# Patient Record
Sex: FEMALE | Race: WHITE
[De-identification: names, ages, dates, MRNs, and addresses within clinical notes are randomized per-mention and may not be internally consistent; named-entity substitution may affect disease eponyms.]

---

## 2017-05-29 ENCOUNTER — TRANSCRIPTION ENCOUNTER (OUTPATIENT)
Age: 32
End: 2017-05-29

## 2018-05-07 PROBLEM — Z00.00 ENCOUNTER FOR PREVENTIVE HEALTH EXAMINATION: Status: ACTIVE | Noted: 2018-05-07

## 2018-06-11 ENCOUNTER — TRANSCRIPTION ENCOUNTER (OUTPATIENT)
Age: 33
End: 2018-06-11

## 2019-06-28 ENCOUNTER — ASOB RESULT (OUTPATIENT)
Age: 34
End: 2019-06-28

## 2019-06-28 ENCOUNTER — APPOINTMENT (OUTPATIENT)
Dept: ANTEPARTUM | Facility: CLINIC | Age: 34
End: 2019-06-28
Payer: COMMERCIAL

## 2019-06-28 PROCEDURE — 76856 US EXAM PELVIC COMPLETE: CPT | Mod: 59

## 2019-06-28 PROCEDURE — 76830 TRANSVAGINAL US NON-OB: CPT

## 2019-10-25 ENCOUNTER — EMERGENCY (EMERGENCY)
Facility: HOSPITAL | Age: 34
LOS: 0 days | Discharge: HOME | End: 2019-10-25
Attending: EMERGENCY MEDICINE
Payer: COMMERCIAL

## 2019-10-25 VITALS
HEART RATE: 76 BPM | TEMPERATURE: 98 F | SYSTOLIC BLOOD PRESSURE: 102 MMHG | RESPIRATION RATE: 17 BRPM | DIASTOLIC BLOOD PRESSURE: 70 MMHG | OXYGEN SATURATION: 100 %

## 2019-10-25 VITALS — HEIGHT: 60 IN | WEIGHT: 119.93 LBS

## 2019-10-25 DIAGNOSIS — R11.2 NAUSEA WITH VOMITING, UNSPECIFIED: ICD-10-CM

## 2019-10-25 DIAGNOSIS — M54.9 DORSALGIA, UNSPECIFIED: ICD-10-CM

## 2019-10-25 DIAGNOSIS — R50.9 FEVER, UNSPECIFIED: ICD-10-CM

## 2019-10-25 DIAGNOSIS — K80.20 CALCULUS OF GALLBLADDER WITHOUT CHOLECYSTITIS WITHOUT OBSTRUCTION: ICD-10-CM

## 2019-10-25 LAB
ALBUMIN SERPL ELPH-MCNC: 3.8 G/DL — SIGNIFICANT CHANGE UP (ref 3.3–5)
ALP SERPL-CCNC: 42 U/L — SIGNIFICANT CHANGE UP (ref 40–120)
ALT FLD-CCNC: 16 U/L — SIGNIFICANT CHANGE UP (ref 12–78)
ANION GAP SERPL CALC-SCNC: 9 MMOL/L — SIGNIFICANT CHANGE UP (ref 5–17)
APPEARANCE UR: ABNORMAL
AST SERPL-CCNC: 14 U/L — LOW (ref 15–37)
BASOPHILS # BLD AUTO: 0.04 K/UL — SIGNIFICANT CHANGE UP (ref 0–0.2)
BASOPHILS NFR BLD AUTO: 0.4 % — SIGNIFICANT CHANGE UP (ref 0–2)
BILIRUB SERPL-MCNC: 0.5 MG/DL — SIGNIFICANT CHANGE UP (ref 0.2–1.2)
BILIRUB UR-MCNC: NEGATIVE — SIGNIFICANT CHANGE UP
BUN SERPL-MCNC: 10 MG/DL — SIGNIFICANT CHANGE UP (ref 7–23)
CALCIUM SERPL-MCNC: 8.8 MG/DL — SIGNIFICANT CHANGE UP (ref 8.5–10.1)
CHLORIDE SERPL-SCNC: 107 MMOL/L — SIGNIFICANT CHANGE UP (ref 96–108)
CO2 SERPL-SCNC: 24 MMOL/L — SIGNIFICANT CHANGE UP (ref 22–31)
COLOR SPEC: YELLOW — SIGNIFICANT CHANGE UP
CREAT SERPL-MCNC: 0.59 MG/DL — SIGNIFICANT CHANGE UP (ref 0.5–1.3)
DIFF PNL FLD: NEGATIVE — SIGNIFICANT CHANGE UP
EOSINOPHIL # BLD AUTO: 0 K/UL — SIGNIFICANT CHANGE UP (ref 0–0.5)
EOSINOPHIL NFR BLD AUTO: 0 % — SIGNIFICANT CHANGE UP (ref 0–6)
GLUCOSE SERPL-MCNC: 97 MG/DL — SIGNIFICANT CHANGE UP (ref 70–99)
GLUCOSE UR QL: NEGATIVE MG/DL — SIGNIFICANT CHANGE UP
HCT VFR BLD CALC: 38.9 % — SIGNIFICANT CHANGE UP (ref 34.5–45)
HGB BLD-MCNC: 12.9 G/DL — SIGNIFICANT CHANGE UP (ref 11.5–15.5)
IMM GRANULOCYTES NFR BLD AUTO: 0.3 % — SIGNIFICANT CHANGE UP (ref 0–1.5)
KETONES UR-MCNC: ABNORMAL
LACTATE SERPL-SCNC: 1.1 MMOL/L — SIGNIFICANT CHANGE UP (ref 0.7–2)
LEUKOCYTE ESTERASE UR-ACNC: ABNORMAL
LIDOCAIN IGE QN: 60 U/L — LOW (ref 73–393)
LYMPHOCYTES # BLD AUTO: 1.04 K/UL — SIGNIFICANT CHANGE UP (ref 1–3.3)
LYMPHOCYTES # BLD AUTO: 11.2 % — LOW (ref 13–44)
MAGNESIUM SERPL-MCNC: 2.3 MG/DL — SIGNIFICANT CHANGE UP (ref 1.6–2.6)
MCHC RBC-ENTMCNC: 30.6 PG — SIGNIFICANT CHANGE UP (ref 27–34)
MCHC RBC-ENTMCNC: 33.2 GM/DL — SIGNIFICANT CHANGE UP (ref 32–36)
MCV RBC AUTO: 92.2 FL — SIGNIFICANT CHANGE UP (ref 80–100)
MONOCYTES # BLD AUTO: 0.41 K/UL — SIGNIFICANT CHANGE UP (ref 0–0.9)
MONOCYTES NFR BLD AUTO: 4.4 % — SIGNIFICANT CHANGE UP (ref 2–14)
NEUTROPHILS # BLD AUTO: 7.79 K/UL — HIGH (ref 1.8–7.4)
NEUTROPHILS NFR BLD AUTO: 83.7 % — HIGH (ref 43–77)
NITRITE UR-MCNC: NEGATIVE — SIGNIFICANT CHANGE UP
PH UR: 6.5 — SIGNIFICANT CHANGE UP (ref 5–8)
PLATELET # BLD AUTO: 263 K/UL — SIGNIFICANT CHANGE UP (ref 150–400)
POTASSIUM SERPL-MCNC: 3.6 MMOL/L — SIGNIFICANT CHANGE UP (ref 3.5–5.3)
POTASSIUM SERPL-SCNC: 3.6 MMOL/L — SIGNIFICANT CHANGE UP (ref 3.5–5.3)
PROT SERPL-MCNC: 7.6 GM/DL — SIGNIFICANT CHANGE UP (ref 6–8.3)
PROT UR-MCNC: 15 MG/DL
RBC # BLD: 4.22 M/UL — SIGNIFICANT CHANGE UP (ref 3.8–5.2)
RBC # FLD: 11.9 % — SIGNIFICANT CHANGE UP (ref 10.3–14.5)
SODIUM SERPL-SCNC: 140 MMOL/L — SIGNIFICANT CHANGE UP (ref 135–145)
SP GR SPEC: 1.01 — SIGNIFICANT CHANGE UP (ref 1.01–1.02)
UROBILINOGEN FLD QL: NEGATIVE MG/DL — SIGNIFICANT CHANGE UP
WBC # BLD: 9.31 K/UL — SIGNIFICANT CHANGE UP (ref 3.8–10.5)
WBC # FLD AUTO: 9.31 K/UL — SIGNIFICANT CHANGE UP (ref 3.8–10.5)

## 2019-10-25 PROCEDURE — 36415 COLL VENOUS BLD VENIPUNCTURE: CPT

## 2019-10-25 PROCEDURE — 99284 EMERGENCY DEPT VISIT MOD MDM: CPT

## 2019-10-25 PROCEDURE — 87040 BLOOD CULTURE FOR BACTERIA: CPT

## 2019-10-25 PROCEDURE — 99284 EMERGENCY DEPT VISIT MOD MDM: CPT | Mod: 25

## 2019-10-25 PROCEDURE — 87086 URINE CULTURE/COLONY COUNT: CPT

## 2019-10-25 PROCEDURE — 71260 CT THORAX DX C+: CPT

## 2019-10-25 PROCEDURE — 96374 THER/PROPH/DIAG INJ IV PUSH: CPT

## 2019-10-25 PROCEDURE — 80053 COMPREHEN METABOLIC PANEL: CPT

## 2019-10-25 PROCEDURE — 93005 ELECTROCARDIOGRAM TRACING: CPT

## 2019-10-25 PROCEDURE — 85025 COMPLETE CBC W/AUTO DIFF WBC: CPT

## 2019-10-25 PROCEDURE — 93010 ELECTROCARDIOGRAM REPORT: CPT

## 2019-10-25 PROCEDURE — 81001 URINALYSIS AUTO W/SCOPE: CPT

## 2019-10-25 PROCEDURE — 74177 CT ABD & PELVIS W/CONTRAST: CPT

## 2019-10-25 PROCEDURE — 71260 CT THORAX DX C+: CPT | Mod: 26

## 2019-10-25 PROCEDURE — 74177 CT ABD & PELVIS W/CONTRAST: CPT | Mod: 26

## 2019-10-25 PROCEDURE — 83735 ASSAY OF MAGNESIUM: CPT

## 2019-10-25 PROCEDURE — 83605 ASSAY OF LACTIC ACID: CPT

## 2019-10-25 PROCEDURE — 83690 ASSAY OF LIPASE: CPT

## 2019-10-25 RX ORDER — SODIUM CHLORIDE 9 MG/ML
1000 INJECTION INTRAMUSCULAR; INTRAVENOUS; SUBCUTANEOUS ONCE
Refills: 0 | Status: COMPLETED | OUTPATIENT
Start: 2019-10-25 | End: 2019-10-25

## 2019-10-25 RX ORDER — ONDANSETRON 8 MG/1
4 TABLET, FILM COATED ORAL ONCE
Refills: 0 | Status: COMPLETED | OUTPATIENT
Start: 2019-10-25 | End: 2019-10-25

## 2019-10-25 RX ADMIN — SODIUM CHLORIDE 2000 MILLILITER(S): 9 INJECTION INTRAMUSCULAR; INTRAVENOUS; SUBCUTANEOUS at 16:57

## 2019-10-25 RX ADMIN — ONDANSETRON 4 MILLIGRAM(S): 8 TABLET, FILM COATED ORAL at 18:35

## 2019-10-25 RX ADMIN — SODIUM CHLORIDE 1000 MILLILITER(S): 9 INJECTION INTRAMUSCULAR; INTRAVENOUS; SUBCUTANEOUS at 18:35

## 2019-10-25 NOTE — ED STATDOCS - OBJECTIVE STATEMENT
33 y/o female with no pertinent PMHX presents to the ED c/o back pain today. +nausea, +vomiting x1 this AM, +fever. Also reports right sided abdominal pain. Pt reports intermittent subjective fever since 10/1/19. Had 2 BMs today. Denies diarrhea, SOB. Has been taking Motrin when needed. States she had imaging done at [facility] yesterday, was called back by PA at PMD Alia's office today with the results, and PA urged pt to come to the ED for further evaluation. No recent travel. Non smoker. No fhx of respiratory issues. PMD: Alia.

## 2019-10-25 NOTE — ED STATDOCS - PROGRESS NOTE DETAILS
33 y/o F with no PMH presents with right sided mid back pain, vomiting, subjective fever x 2-3 days. Saw PCP, had outpatient US which showed gallstones yesterday and reported today, advised to go to ED. Denies HA, dizziness, cough, dysuria, hematuria, vaginal bleeding/discharge, diarrhea, constipation. PCP: Dr. Rojo. PE: Well appearing. Cardiac: s1s2, RRR. Lungs: decreased breath sounds right base. CTA left lung fields. Abdomen: NBS x4, soft, nontender. No CVAT. A/P: R/o cholecystitis, PNA. Plan for CT C/A/P, labs, IVF, analgesia PRN, antiemetics. Reassess. - Chaparro Holly PA-C Labs and imaging reviewed. +gallstones in bladder. No noted acute pathology on imaging. Labs unremarkable. Patient made aware of findings. WIll d/c at this time with surgical follow up. Strict return precautions provided including worsening pain, vomiting, yellow discoloration to skin or eyes, abnormal behavior. Will d/c at this time. - Chaparro Holly PA-C

## 2019-10-25 NOTE — ED STATDOCS - GASTROINTESTINAL, MLM
abdomen soft,  and non-distended. Bowel sounds present. +mild diffuse TTP without guarding or rebound abdomen soft,  and non-distended. Bowel sounds present. +mild lower abd TTP without guarding or rebound

## 2019-10-25 NOTE — ED ADULT NURSE NOTE - NSIMPLEMENTINTERV_GEN_ALL_ED
Implemented All Universal Safety Interventions:  [place] to call system. Call bell, personal items and telephone within reach. Instruct patient to call for assistance. Room bathroom lighting operational. Non-slip footwear when patient is off stretcher. Physically safe environment: no spills, clutter or unnecessary equipment. Stretcher in lowest position, wheels locked, appropriate side rails in place.

## 2019-10-25 NOTE — ED ADULT NURSE NOTE - OBJECTIVE STATEMENT
pt presents to ED c/o RUQ pain, HA, n/v, and fevers at home x1 months. reports she got a CT abd and pelvis that showed gallstones and was instructed by her MD to come to the ED. Pt AOx4, breathing symmetrical and unlabored, in no acute distress at this time.

## 2019-10-25 NOTE — ED STATDOCS - CARE PROVIDER_API CALL
Rehana Cleaning)  Surgery; Surgical Critical Care  [address] [facility], [address] [place] [address]  Phone: [phone]  Fax: [phone]  Follow Up Time:

## 2019-10-25 NOTE — ED STATDOCS - CLINICAL SUMMARY MEDICAL DECISION MAKING FREE TEXT BOX
Pt here with right sided abdominal pain, nausea, vomiting. Plan: labs, CT, reassess. Pt here with right sided abdominal pain, nausea, vomiting. Plan: labs, CT, reassess. CTs +for gallstones, otherwise unremarkable. Labs unremarkable. Pt feels well. WIll d/c at this time with outpatient surgical follow up. Strict return precautions provided.

## 2019-10-25 NOTE — ED STATDOCS - PATIENT PORTAL LINK FT
You can access the FollowMyHealth Patient Portal offered by [facility] by registering at the following website: [email or web address]. By joining [facility]’s FollowMyHealth portal, you will also be able to view your health information using other applications (apps) compatible with our system.

## 2019-10-25 NOTE — ED ADULT TRIAGE NOTE - CHIEF COMPLAINT QUOTE
Patient presents complaining of back pain for "weeks". Sent for CT scan after out patient ultrasound showed "gallbladder problem".

## 2019-10-25 NOTE — ED STATDOCS - ATTENDING CONTRIBUTION TO CARE
I, Sharmila Finn MD,  performed the initial face to face bedside interview with this patient regarding history of present illness, review of symptoms and relevant past medical, social and family history.  I completed an independent physical examination.  I was the initial provider who evaluated this patient. I have signed out the follow up of any pending tests (i.e. labs, radiological studies) to the ACP.  I have communicated the patient’s plan of care and disposition with the ACP.  The history, relevant review of systems, past medical and surgical history, medical decision making, and physical examination was documented by the scribe in my presence and I attest to the accuracy of the documentation.

## 2019-10-26 LAB
CULTURE RESULTS: SIGNIFICANT CHANGE UP
SPECIMEN SOURCE: SIGNIFICANT CHANGE UP

## 2019-10-30 LAB
CULTURE RESULTS: SIGNIFICANT CHANGE UP
CULTURE RESULTS: SIGNIFICANT CHANGE UP
SPECIMEN SOURCE: SIGNIFICANT CHANGE UP
SPECIMEN SOURCE: SIGNIFICANT CHANGE UP

## 2019-11-06 ENCOUNTER — OUTPATIENT (OUTPATIENT)
Dept: OUTPATIENT SERVICES | Facility: HOSPITAL | Age: 34
LOS: 1 days | End: 2019-11-06
Payer: COMMERCIAL

## 2019-11-06 VITALS
HEIGHT: 60 IN | RESPIRATION RATE: 14 BRPM | WEIGHT: 119.05 LBS | TEMPERATURE: 98 F | DIASTOLIC BLOOD PRESSURE: 60 MMHG | SYSTOLIC BLOOD PRESSURE: 101 MMHG | HEART RATE: 88 BPM | OXYGEN SATURATION: 98 %

## 2019-11-06 DIAGNOSIS — Z98.890 OTHER SPECIFIED POSTPROCEDURAL STATES: Chronic | ICD-10-CM

## 2019-11-06 DIAGNOSIS — K80.10 CALCULUS OF GALLBLADDER WITH CHRONIC CHOLECYSTITIS WITHOUT OBSTRUCTION: ICD-10-CM

## 2019-11-06 DIAGNOSIS — Z01.818 ENCOUNTER FOR OTHER PREPROCEDURAL EXAMINATION: ICD-10-CM

## 2019-11-06 LAB — HCG SERPL-ACNC: <1 MIU/ML — SIGNIFICANT CHANGE UP

## 2019-11-06 PROCEDURE — 86900 BLOOD TYPING SEROLOGIC ABO: CPT

## 2019-11-06 PROCEDURE — 84702 CHORIONIC GONADOTROPIN TEST: CPT

## 2019-11-06 PROCEDURE — G0463: CPT

## 2019-11-06 PROCEDURE — 86850 RBC ANTIBODY SCREEN: CPT

## 2019-11-06 PROCEDURE — 86901 BLOOD TYPING SEROLOGIC RH(D): CPT

## 2019-11-06 PROCEDURE — 36415 COLL VENOUS BLD VENIPUNCTURE: CPT

## 2019-11-06 NOTE — H&P PST ADULT - NSANTHOSAYNRD_GEN_A_CORE
No. WENDI screening performed.  STOP BANG Legend: 0-2 = LOW Risk; 3-4 = INTERMEDIATE Risk; 5-8 = HIGH Risk

## 2019-11-06 NOTE — H&P PST ADULT - NSICDXPROBLEM_GEN_ALL_CORE_FT
PROBLEM DIAGNOSES  Problem: Calculus of gallbladder with chronic cholecystitis without obstruction  Assessment and Plan: Labs on chart from Health system on 10/25/19 - T&S and HcG drwan today in PST - No medical clearance needed - pt instructed to stop any NSAIDS/Herbal supplements between now and procedure - pt may take Tylenol if needed for pain between now and procedure - pre-op instructions as well as pre-op wash instructions given to tp with understanding verbalized

## 2019-11-06 NOTE — H&P PST ADULT - NSICDXPASTMEDICALHX_GEN_ALL_CORE_FT
PAST MEDICAL HISTORY:  Calculus of gallbladder with chronic cholecystitis without obstruction     H/O dizziness Notes due to H/O Hypotension    H/O hypotension     Migraines

## 2019-11-06 NOTE — H&P PST ADULT - HISTORY OF PRESENT ILLNESS
34 year old female presents for PST prior to laparoscopic Cholecystectomy with Dr Warren Graham on 11/15/19 - Pt notes she has had "3 Gallbladder attacks since 10/1/19." Pt reports attacks had nausea, vomiting, abdominal pain from right breast around to back although notes mostly concentrated to the back." (10/1/19, 10/13/19 - unable to work Mon 10/14/19 and then again on 10/25/19) - pt notes on 10/24 she was seen by her PCP and sent for Sonogram however on  10/25 she presented to [facility] ER as she was having fever as well as N/V, Pain - had blood work, EKG, Abd Sono- pt was told to follow up as outpatient with surgeon which she notes never called her back - pt then chose to see Dr Graham - following consult with Dr Graham pt is electing for scheduled procedure.

## 2019-11-06 NOTE — H&P PST ADULT - ASSESSMENT
34 year old female with Calculus of Gallbladder with chronic cholecystitis without obstruction - scheduled for laparoscopic Cholecystectomy with Dr Warren Graham on 11/15/19 -

## 2019-11-06 NOTE — H&P PST ADULT - GENERAL COMMENTS
Pt does note she is currently taking course of ABX Amoxicillin for Strep Throat DX Mon 11/4/19 - Course will finish on 11/12/19 -

## 2019-11-14 ENCOUNTER — TRANSCRIPTION ENCOUNTER (OUTPATIENT)
Age: 34
End: 2019-11-14

## 2019-11-14 NOTE — ASU PATIENT PROFILE, ADULT - PMH
Calculus of gallbladder with chronic cholecystitis without obstruction    H/O dizziness  Notes due to H/O Hypotension  H/O hypotension    Migraines

## 2019-11-15 ENCOUNTER — RESULT REVIEW (OUTPATIENT)
Age: 34
End: 2019-11-15

## 2019-11-15 ENCOUNTER — OUTPATIENT (OUTPATIENT)
Dept: OUTPATIENT SERVICES | Facility: HOSPITAL | Age: 34
LOS: 1 days | End: 2019-11-15
Payer: COMMERCIAL

## 2019-11-15 VITALS
HEIGHT: 60 IN | SYSTOLIC BLOOD PRESSURE: 117 MMHG | WEIGHT: 119.05 LBS | OXYGEN SATURATION: 99 % | RESPIRATION RATE: 14 BRPM | TEMPERATURE: 98 F | HEART RATE: 72 BPM | DIASTOLIC BLOOD PRESSURE: 61 MMHG

## 2019-11-15 VITALS
OXYGEN SATURATION: 98 % | DIASTOLIC BLOOD PRESSURE: 64 MMHG | SYSTOLIC BLOOD PRESSURE: 103 MMHG | TEMPERATURE: 98 F | RESPIRATION RATE: 15 BRPM | HEART RATE: 67 BPM

## 2019-11-15 DIAGNOSIS — Z98.890 OTHER SPECIFIED POSTPROCEDURAL STATES: Chronic | ICD-10-CM

## 2019-11-15 DIAGNOSIS — K80.10 CALCULUS OF GALLBLADDER WITH CHRONIC CHOLECYSTITIS WITHOUT OBSTRUCTION: ICD-10-CM

## 2019-11-15 DIAGNOSIS — Z01.818 ENCOUNTER FOR OTHER PREPROCEDURAL EXAMINATION: ICD-10-CM

## 2019-11-15 LAB — HCG SERPL-ACNC: <1 MIU/ML — SIGNIFICANT CHANGE UP

## 2019-11-15 PROCEDURE — 47562 LAPAROSCOPIC CHOLECYSTECTOMY: CPT

## 2019-11-15 PROCEDURE — 88304 TISSUE EXAM BY PATHOLOGIST: CPT

## 2019-11-15 PROCEDURE — 88304 TISSUE EXAM BY PATHOLOGIST: CPT | Mod: 26

## 2019-11-15 PROCEDURE — 47562 LAPAROSCOPIC CHOLECYSTECTOMY: CPT | Mod: AS

## 2019-11-15 PROCEDURE — 84702 CHORIONIC GONADOTROPIN TEST: CPT

## 2019-11-15 PROCEDURE — 36415 COLL VENOUS BLD VENIPUNCTURE: CPT

## 2019-11-15 PROCEDURE — C1889: CPT

## 2019-11-15 RX ORDER — HYDROMORPHONE HYDROCHLORIDE 2 MG/ML
0.5 INJECTION INTRAMUSCULAR; INTRAVENOUS; SUBCUTANEOUS
Refills: 0 | Status: DISCONTINUED | OUTPATIENT
Start: 2019-11-15 | End: 2019-11-15

## 2019-11-15 RX ORDER — CEFAZOLIN SODIUM 1 G
2000 VIAL (EA) INJECTION ONCE
Refills: 0 | Status: COMPLETED | OUTPATIENT
Start: 2019-11-15 | End: 2019-11-15

## 2019-11-15 RX ORDER — OXYCODONE HYDROCHLORIDE 5 MG/1
5 TABLET ORAL ONCE
Refills: 0 | Status: DISCONTINUED | OUTPATIENT
Start: 2019-11-15 | End: 2019-11-15

## 2019-11-15 RX ORDER — IBUPROFEN 200 MG
1 TABLET ORAL
Qty: 30 | Refills: 0
Start: 2019-11-15 | End: 2019-11-21

## 2019-11-15 RX ORDER — OXYCODONE HYDROCHLORIDE 5 MG/1
10 TABLET ORAL ONCE
Refills: 0 | Status: DISCONTINUED | OUTPATIENT
Start: 2019-11-15 | End: 2019-11-15

## 2019-11-15 RX ORDER — HYDROMORPHONE HYDROCHLORIDE 2 MG/ML
0.5 INJECTION INTRAMUSCULAR; INTRAVENOUS; SUBCUTANEOUS ONCE
Refills: 0 | Status: DISCONTINUED | OUTPATIENT
Start: 2019-11-15 | End: 2019-11-15

## 2019-11-15 RX ORDER — SODIUM CHLORIDE 9 MG/ML
1000 INJECTION, SOLUTION INTRAVENOUS
Refills: 0 | Status: DISCONTINUED | OUTPATIENT
Start: 2019-11-15 | End: 2019-11-15

## 2019-11-15 RX ORDER — HYDROMORPHONE HYDROCHLORIDE 2 MG/ML
1 INJECTION INTRAMUSCULAR; INTRAVENOUS; SUBCUTANEOUS ONCE
Refills: 0 | Status: DISCONTINUED | OUTPATIENT
Start: 2019-11-15 | End: 2019-11-15

## 2019-11-15 RX ORDER — METOCLOPRAMIDE HCL 10 MG
10 TABLET ORAL ONCE
Refills: 0 | Status: COMPLETED | OUTPATIENT
Start: 2019-11-15 | End: 2019-11-15

## 2019-11-15 RX ORDER — DOCUSATE SODIUM 100 MG
1 CAPSULE ORAL
Qty: 30 | Refills: 0
Start: 2019-11-15 | End: 2019-11-24

## 2019-11-15 RX ADMIN — HYDROMORPHONE HYDROCHLORIDE 0.5 MILLIGRAM(S): 2 INJECTION INTRAMUSCULAR; INTRAVENOUS; SUBCUTANEOUS at 16:29

## 2019-11-15 RX ADMIN — Medication 10 MILLIGRAM(S): at 16:44

## 2019-11-15 RX ADMIN — HYDROMORPHONE HYDROCHLORIDE 0.5 MILLIGRAM(S): 2 INJECTION INTRAMUSCULAR; INTRAVENOUS; SUBCUTANEOUS at 16:07

## 2019-11-15 RX ADMIN — SODIUM CHLORIDE 75 MILLILITER(S): 9 INJECTION, SOLUTION INTRAVENOUS at 12:53

## 2019-11-15 RX ADMIN — SODIUM CHLORIDE 100 MILLILITER(S): 9 INJECTION, SOLUTION INTRAVENOUS at 15:58

## 2019-11-15 RX ADMIN — OXYCODONE HYDROCHLORIDE 5 MILLIGRAM(S): 5 TABLET ORAL at 16:50

## 2019-11-15 RX ADMIN — HYDROMORPHONE HYDROCHLORIDE 0.5 MILLIGRAM(S): 2 INJECTION INTRAMUSCULAR; INTRAVENOUS; SUBCUTANEOUS at 16:39

## 2019-11-15 RX ADMIN — HYDROMORPHONE HYDROCHLORIDE 0.5 MILLIGRAM(S): 2 INJECTION INTRAMUSCULAR; INTRAVENOUS; SUBCUTANEOUS at 15:56

## 2019-11-15 NOTE — ASU DISCHARGE PLAN (ADULT/PEDIATRIC) - CARE PROVIDER_API CALL
Warren Graham)  Surgery  [address] [facility], [address] [place] [address]  Phone: [phone]  Fax: [phone]  Follow Up Time:

## 2019-11-15 NOTE — BRIEF OPERATIVE NOTE - OPERATION/FINDINGS
Pericholecystic edema and fatty deposition.  Multiple stones in gallbladder.  Critical view of safety obtained.

## 2019-11-15 NOTE — ASU DISCHARGE PLAN (ADULT/PEDIATRIC) - ASU DC SPECIAL INSTRUCTIONSFT
You may remove the clear adhesive and gauze dressings and shower in 48 hours.  Do not remove steri-strips.  Do not let water hit wounds directly, do not rub incisions.      No heavy lifting (nothing heavier than 5 lbs.), no strenuous physical activity, no exercise.      You may perform your usual daily tasks as tolerated.      You may resume your usual daily diet as tolerated.      Do not drive for 24 hours following anesthesia.  Do not drive while taking narcotic pain meds.      Take percocet as prescribed for pain.  Take colace as prescribed while taking narcotic pain medication to prevent constipation.  Take ibuprofen as prescribed for moderate pain - take with food.      Follow-up with Dr. Graham in his office next week - call office for appointment if not already made.

## 2019-11-15 NOTE — BRIEF OPERATIVE NOTE - NSICDXBRIEFPROCEDURE_GEN_ALL_CORE_FT
PROCEDURES:  Laparoscopic cholecystectomy in patient older than 10 years of age 15-Nov-2019 15:55:42  Eliecer Jarquin

## 2019-11-19 LAB — SURGICAL PATHOLOGY STUDY: SIGNIFICANT CHANGE UP

## 2020-02-11 NOTE — ED STATDOCS - NSCAREINITIATED _GEN_ER
Closed Head Injury    A closed head injury is an injury to your head that may or may not involve a traumatic brain injury (TBI). Symptoms of TBI can be short or long lasting and include headache, dizziness, interference with memory or speech, fatigue, confusion, changes in sleep, mood changes, nausea, depression/anxiety, and dulling of senses. Make sure to obtain proper rest which includes getting plenty of sleep, avoiding excessive visual stimulation, and avoiding activities that may cause physical or mental stress. Avoid any situation where there is potential for another head injury, including sports.    SEEK IMMEDIATE MEDICAL CARE IF YOU HAVE ANY OF THE FOLLOWING SYMPTOMS: unusual drowsiness, vomiting, severe dizziness, seizures, lightheadedness, muscular weakness, different pupil sizes, visual changes, or clear or bloody discharge from your ears or nose.     Contusion    A contusion is a deep bruise. Contusions are the result of a blunt injury to tissues and muscle fibers under the skin. The skin overlying the contusion may turn blue, purple, or yellow. Symptoms also include pain and swelling in the injured area.    SEEK IMMEDIATE MEDICAL CARE IF YOU HAVE ANY OF THE FOLLOWING SYMPTOMS: severe pain, numbness, tingling, pain, weakness, or skin color/temperature change in any part of your body distal to the injury.
Michele Johnson(Attending)

## 2020-05-25 ENCOUNTER — TRANSCRIPTION ENCOUNTER (OUTPATIENT)
Age: 35
End: 2020-05-25

## 2020-10-26 NOTE — ED STATDOCS - NSFOLLOWUPINSTRUCTIONS_ED_ALL_ED_FT
Cholelithiasis  Image   Cholelithiasis is a form of gallbladder disease in which gallstones form in the gallbladder. The gallbladder is an organ that stores bile. Bile is made in the liver, and it helps to digest fats. Gallstones begin as small crystals and slowly grow into stones. They may cause no symptoms until the gallbladder tightens (contracts) and a gallstone is blocking the duct (gallbladder attack), which can cause pain. Cholelithiasis is also referred to as gallstones.  There are two main types of gallstones:  Cholesterol stones. These are made of hardened cholesterol and are usually yellow-green in color. They are the most common type of gallstone. Cholesterol is a white, waxy, fat-like substance that is made in the liver.Pigment stones. These are dark in color and are made of a red-yellow substance that forms when hemoglobin from red blood cells breaks down (bilirubin).What are the causes?  This condition may be caused by an imbalance in the substances that bile is made of. This can happen if the bile:  Has too much bilirubin.Has too much cholesterol.Does not have enough bile salts. These salts help the body absorb and digest fats.In some cases, this condition can also be caused by the gallbladder not emptying completely or often enough.  What increases the risk?  The following factors may make you more likely to develop this condition:  Being female.Having multiple pregnancies. Health care providers sometimes advise removing diseased gallbladders before future pregnancies.Eating a diet that is heavy in fried foods, fat, and refined carbohydrates, like white bread and white rice.Being obese.Being older than age 40.Prolonged use of medicines that contain female hormones (estrogen).Having diabetes mellitus.Rapidly losing weight.Having a family history of gallstones.Being of  or [place] descent.Having an intestinal disease such as Crohn disease.Having metabolic syndrome.Having cirrhosis.Having severe types of anemia such as sickle cell anemia.What are the signs or symptoms?  In most cases, there are no symptoms. These are known as silent gallstones. If a gallstone blocks the bile ducts, it can cause a gallbladder attack. The main symptom of a gallbladder attack is sudden pain in the upper right abdomen. The pain usually comes at night or after eating a large meal. The pain can last for one or several hours and can spread to the right shoulder or chest.  If the bile duct is blocked for more than a few hours, it can cause infection or inflammation of the gallbladder, liver, or pancreas, which may cause:  Nausea.Vomiting.Abdominal pain that lasts for 5 hours or more.Fever or chills.Yellowing of the skin or the whites of the eyes (jaundice).Dark urine.Light-colored stools.How is this diagnosed?  This condition may be diagnosed based on:  A physical exam.Your medical history.An ultrasound of your gallbladder.CT scan.MRI.Blood tests to check for signs of infection or inflammation.A scan of your gallbladder and bile ducts (biliary system) using nonharmful radioactive material and special cameras that can see the radioactive material (cholescintigram). This test checks to see how your gallbladder contracts and whether bile ducts are blocked.Inserting a small tube with a camera on the end (endoscope) through your mouth to inspect bile ducts and check for blockages (endoscopic retrograde cholangiopancreatogram).How is this treated?  Treatment for gallstones depends on the severity of the condition. Silent gallstones do not need treatment. If the gallstones cause a gallbladder attack or other symptoms, treatment may be required. Options for treatment include:  Surgery to remove the gallbladder (cholecystectomy). This is the most common treatment.Medicines to dissolve gallstones. These are most effective at treating small gallstones. You may need to take medicines for up to 6–12 months.Shock wave treatment (extracorporeal biliary lithotripsy). In this treatment, an ultrasound machine sends shock waves to the gallbladder to break gallstones into smaller pieces. These pieces can then be passed into the intestines or be dissolved by medicine. This is rarely used.Removing gallstones through endoscopic retrograde cholangiopancreatogram. A small basket can be attached to the endoscope and used to capture and remove gallstones.Follow these instructions at home:  Take over-the-counter and prescription medicines only as told by your health care provider.Maintain a healthy weight and follow a healthy diet. This includes:  Reducing fatty foods, such as fried food.Reducing refined carbohydrates, like white bread and white rice.Increasing fiber. Aim for foods like almonds, fruit, and beans.Keep all follow-up visits as told by your health care provider. This is important.Contact a health care provider if:  You think you have had a gallbladder attack.You have been diagnosed with silent gallstones and you develop abdominal pain or indigestion.Get help right away if:  You have pain from a gallbladder attack that lasts for more than 2 hours.You have abdominal pain that lasts for more than 5 hours.You have a fever or chills.You have persistent nausea and vomiting.You develop jaundice.You have dark urine or light-colored stools.Summary  Cholelithiasis (also called gallstones) is a form of gallbladder disease in which gallstones form in the gallbladder.This condition is caused by an imbalance in the substances that make up bile. This can happen if the bile has too much cholesterol, too much bilirubin, or not enough bile salts.You are more likely to develop this condition if you are female, pregnant, using medicines with estrogen, obese, older than age 40, or have a family history of gallstones. You may also develop gallstones if you have diabetes, an intestinal disease, cirrhosis, or metabolic syndrome.Treatment for gallstones depends on the severity of the condition. Silent gallstones do not need treatment.If gallstones cause a gallbladder attack or other symptoms, treatment may be needed. The most common treatment is surgery to remove the gallbladder.This information is not intended to replace advice given to you by your health care provider. Make sure you discuss any questions you have with your health care provider.
Simple: Patient demonstrates quick and easy understanding/Verbalized Understanding/Patient asked questions

## 2021-02-23 ENCOUNTER — TRANSCRIPTION ENCOUNTER (OUTPATIENT)
Age: 36
End: 2021-02-23

## 2021-04-26 ENCOUNTER — TRANSCRIPTION ENCOUNTER (OUTPATIENT)
Age: 36
End: 2021-04-26

## 2021-08-24 ENCOUNTER — TRANSCRIPTION ENCOUNTER (OUTPATIENT)
Age: 36
End: 2021-08-24

## 2022-01-13 ENCOUNTER — TRANSCRIPTION ENCOUNTER (OUTPATIENT)
Age: 37
End: 2022-01-13

## 2022-01-17 ENCOUNTER — TRANSCRIPTION ENCOUNTER (OUTPATIENT)
Age: 37
End: 2022-01-17

## 2022-01-31 ENCOUNTER — TRANSCRIPTION ENCOUNTER (OUTPATIENT)
Age: 37
End: 2022-01-31

## 2022-08-06 ENCOUNTER — NON-APPOINTMENT (OUTPATIENT)
Age: 37
End: 2022-08-06

## 2022-08-10 ENCOUNTER — NON-APPOINTMENT (OUTPATIENT)
Age: 37
End: 2022-08-10

## 2022-11-13 ENCOUNTER — NON-APPOINTMENT (OUTPATIENT)
Age: 37
End: 2022-11-13

## 2022-12-09 ENCOUNTER — NON-APPOINTMENT (OUTPATIENT)
Age: 37
End: 2022-12-09

## 2022-12-11 ENCOUNTER — NON-APPOINTMENT (OUTPATIENT)
Age: 37
End: 2022-12-11

## 2023-03-09 ENCOUNTER — NON-APPOINTMENT (OUTPATIENT)
Age: 38
End: 2023-03-09

## 2023-03-11 ENCOUNTER — NON-APPOINTMENT (OUTPATIENT)
Age: 38
End: 2023-03-11

## 2023-10-15 ENCOUNTER — EMERGENCY (EMERGENCY)
Facility: HOSPITAL | Age: 38
LOS: 0 days | Discharge: HOME | End: 2023-10-15
Attending: FAMILY MEDICINE
Payer: COMMERCIAL

## 2023-10-15 VITALS
OXYGEN SATURATION: 99 % | RESPIRATION RATE: 20 BRPM | SYSTOLIC BLOOD PRESSURE: 90 MMHG | HEART RATE: 70 BPM | DIASTOLIC BLOOD PRESSURE: 67 MMHG

## 2023-10-15 VITALS
WEIGHT: 134.92 LBS | TEMPERATURE: 98 F | SYSTOLIC BLOOD PRESSURE: 94 MMHG | OXYGEN SATURATION: 100 % | RESPIRATION RATE: 18 BRPM | DIASTOLIC BLOOD PRESSURE: 48 MMHG | HEART RATE: 69 BPM | HEIGHT: 60 IN

## 2023-10-15 DIAGNOSIS — Z79.3 LONG TERM (CURRENT) USE OF HORMONAL CONTRACEPTIVES: ICD-10-CM

## 2023-10-15 DIAGNOSIS — Z87.19 PERSONAL HISTORY OF OTHER DISEASES OF THE DIGESTIVE SYSTEM: ICD-10-CM

## 2023-10-15 DIAGNOSIS — G43.909 MIGRAINE, UNSPECIFIED, NOT INTRACTABLE, WITHOUT STATUS MIGRAINOSUS: ICD-10-CM

## 2023-10-15 DIAGNOSIS — R06.02 SHORTNESS OF BREATH: ICD-10-CM

## 2023-10-15 DIAGNOSIS — R42 DIZZINESS AND GIDDINESS: ICD-10-CM

## 2023-10-15 DIAGNOSIS — Z98.890 OTHER SPECIFIED POSTPROCEDURAL STATES: Chronic | ICD-10-CM

## 2023-10-15 DIAGNOSIS — Z87.42 PERSONAL HISTORY OF OTHER DISEASES OF THE FEMALE GENITAL TRACT: ICD-10-CM

## 2023-10-15 DIAGNOSIS — Z86.79 PERSONAL HISTORY OF OTHER DISEASES OF THE CIRCULATORY SYSTEM: ICD-10-CM

## 2023-10-15 DIAGNOSIS — R55 SYNCOPE AND COLLAPSE: ICD-10-CM

## 2023-10-15 DIAGNOSIS — Z96.89 PRESENCE OF OTHER SPECIFIED FUNCTIONAL IMPLANTS: ICD-10-CM

## 2023-10-15 DIAGNOSIS — R00.1 BRADYCARDIA, UNSPECIFIED: ICD-10-CM

## 2023-10-15 LAB
ALBUMIN SERPL ELPH-MCNC: 3.4 G/DL — SIGNIFICANT CHANGE UP (ref 3.3–5)
ALP SERPL-CCNC: 46 U/L — SIGNIFICANT CHANGE UP (ref 40–120)
ALT FLD-CCNC: 18 U/L — SIGNIFICANT CHANGE UP (ref 12–78)
ANION GAP SERPL CALC-SCNC: 4 MMOL/L — LOW (ref 5–17)
APTT BLD: 26.4 SEC — SIGNIFICANT CHANGE UP (ref 24.5–35.6)
AST SERPL-CCNC: 10 U/L — LOW (ref 15–37)
BASOPHILS # BLD AUTO: 0.04 K/UL — SIGNIFICANT CHANGE UP (ref 0–0.2)
BASOPHILS NFR BLD AUTO: 0.4 % — SIGNIFICANT CHANGE UP (ref 0–2)
BILIRUB SERPL-MCNC: 0.3 MG/DL — SIGNIFICANT CHANGE UP (ref 0.2–1.2)
BUN SERPL-MCNC: 10 MG/DL — SIGNIFICANT CHANGE UP (ref 7–23)
CALCIUM SERPL-MCNC: 8.5 MG/DL — SIGNIFICANT CHANGE UP (ref 8.5–10.1)
CHLORIDE SERPL-SCNC: 110 MMOL/L — HIGH (ref 96–108)
CO2 SERPL-SCNC: 26 MMOL/L — SIGNIFICANT CHANGE UP (ref 22–31)
CREAT SERPL-MCNC: 0.67 MG/DL — SIGNIFICANT CHANGE UP (ref 0.5–1.3)
D DIMER BLD IA.RAPID-MCNC: <150 NG/ML DDU — SIGNIFICANT CHANGE UP
EGFR: 116 ML/MIN/1.73M2 — SIGNIFICANT CHANGE UP
EOSINOPHIL # BLD AUTO: 0.13 K/UL — SIGNIFICANT CHANGE UP (ref 0–0.5)
EOSINOPHIL NFR BLD AUTO: 1.2 % — SIGNIFICANT CHANGE UP (ref 0–6)
GLUCOSE SERPL-MCNC: 115 MG/DL — HIGH (ref 70–99)
HCT VFR BLD CALC: 38.2 % — SIGNIFICANT CHANGE UP (ref 34.5–45)
HGB BLD-MCNC: 13 G/DL — SIGNIFICANT CHANGE UP (ref 11.5–15.5)
IMM GRANULOCYTES NFR BLD AUTO: 0.4 % — SIGNIFICANT CHANGE UP (ref 0–0.9)
INR BLD: 0.94 RATIO — SIGNIFICANT CHANGE UP (ref 0.85–1.18)
LYMPHOCYTES # BLD AUTO: 1.56 K/UL — SIGNIFICANT CHANGE UP (ref 1–3.3)
LYMPHOCYTES # BLD AUTO: 13.8 % — SIGNIFICANT CHANGE UP (ref 13–44)
MCHC RBC-ENTMCNC: 32.2 PG — SIGNIFICANT CHANGE UP (ref 27–34)
MCHC RBC-ENTMCNC: 34 GM/DL — SIGNIFICANT CHANGE UP (ref 32–36)
MCV RBC AUTO: 94.6 FL — SIGNIFICANT CHANGE UP (ref 80–100)
MONOCYTES # BLD AUTO: 0.6 K/UL — SIGNIFICANT CHANGE UP (ref 0–0.9)
MONOCYTES NFR BLD AUTO: 5.3 % — SIGNIFICANT CHANGE UP (ref 2–14)
NEUTROPHILS # BLD AUTO: 8.93 K/UL — HIGH (ref 1.8–7.4)
NEUTROPHILS NFR BLD AUTO: 78.9 % — HIGH (ref 43–77)
PLATELET # BLD AUTO: 269 K/UL — SIGNIFICANT CHANGE UP (ref 150–400)
POTASSIUM SERPL-MCNC: 3.8 MMOL/L — SIGNIFICANT CHANGE UP (ref 3.5–5.3)
POTASSIUM SERPL-SCNC: 3.8 MMOL/L — SIGNIFICANT CHANGE UP (ref 3.5–5.3)
PROT SERPL-MCNC: 6.6 GM/DL — SIGNIFICANT CHANGE UP (ref 6–8.3)
PROTHROM AB SERPL-ACNC: 10.6 SEC — SIGNIFICANT CHANGE UP (ref 9.5–13)
RBC # BLD: 4.04 M/UL — SIGNIFICANT CHANGE UP (ref 3.8–5.2)
RBC # FLD: 12 % — SIGNIFICANT CHANGE UP (ref 10.3–14.5)
SODIUM SERPL-SCNC: 140 MMOL/L — SIGNIFICANT CHANGE UP (ref 135–145)
TROPONIN I, HIGH SENSITIVITY RESULT: <3 NG/L — SIGNIFICANT CHANGE UP
WBC # BLD: 11.3 K/UL — HIGH (ref 3.8–10.5)
WBC # FLD AUTO: 11.3 K/UL — HIGH (ref 3.8–10.5)

## 2023-10-15 PROCEDURE — 85730 THROMBOPLASTIN TIME PARTIAL: CPT

## 2023-10-15 PROCEDURE — 93005 ELECTROCARDIOGRAM TRACING: CPT

## 2023-10-15 PROCEDURE — 85379 FIBRIN DEGRADATION QUANT: CPT

## 2023-10-15 PROCEDURE — 71045 X-RAY EXAM CHEST 1 VIEW: CPT | Mod: 26

## 2023-10-15 PROCEDURE — 80053 COMPREHEN METABOLIC PANEL: CPT

## 2023-10-15 PROCEDURE — 71045 X-RAY EXAM CHEST 1 VIEW: CPT

## 2023-10-15 PROCEDURE — 36415 COLL VENOUS BLD VENIPUNCTURE: CPT

## 2023-10-15 PROCEDURE — 85610 PROTHROMBIN TIME: CPT

## 2023-10-15 PROCEDURE — 99285 EMERGENCY DEPT VISIT HI MDM: CPT | Mod: 25

## 2023-10-15 PROCEDURE — 84484 ASSAY OF TROPONIN QUANT: CPT

## 2023-10-15 PROCEDURE — 85025 COMPLETE CBC W/AUTO DIFF WBC: CPT

## 2023-10-15 PROCEDURE — 99285 EMERGENCY DEPT VISIT HI MDM: CPT

## 2023-10-15 PROCEDURE — 93010 ELECTROCARDIOGRAM REPORT: CPT

## 2023-10-15 RX ORDER — ACETAMINOPHEN 500 MG
650 TABLET ORAL ONCE
Refills: 0 | Status: COMPLETED | OUTPATIENT
Start: 2023-10-15 | End: 2023-10-15

## 2023-10-15 RX ORDER — SODIUM CHLORIDE 9 MG/ML
1000 INJECTION INTRAMUSCULAR; INTRAVENOUS; SUBCUTANEOUS ONCE
Refills: 0 | Status: COMPLETED | OUTPATIENT
Start: 2023-10-15 | End: 2023-10-15

## 2023-10-15 RX ADMIN — Medication 650 MILLIGRAM(S): at 18:02

## 2023-10-15 RX ADMIN — SODIUM CHLORIDE 1000 MILLILITER(S): 9 INJECTION INTRAMUSCULAR; INTRAVENOUS; SUBCUTANEOUS at 18:02

## 2023-10-15 NOTE — ED PROVIDER NOTE - CLINICAL SUMMARY MEDICAL DECISION MAKING FREE TEXT BOX
Patient with history vasovagal syncope, migraines on propanolol increased 3 weeks ago c/o near syncope with warning, probable vasovagal. Labs, EKG, cxr, probably dc.

## 2023-10-15 NOTE — ED PROVIDER NOTE - OBJECTIVE STATEMENT
39 y/o female with PMHx of migraines, vasovagal syncopal episodes presents to the ED BIBEMS c/o near syncope since this morning. Patient states she felt warm in her office, went outside, went to bathroom and noticed she was pale, ambulance was called. Recently increased propanolol dose 3 weeks ago. Endorses normal PO intake. Denies SOB, cp, LOC, N/V. LMP normal and on time.

## 2023-10-15 NOTE — ED ADULT NURSE NOTE - OBJECTIVE STATEMENT
PT presents to er with complaints of feeling like she was going to have a syncopal episode at work, states she is on Propanolol daily for migraines and took dose earlier today at regularly scheduled times, NSR on CM, pt A&OX4, denies chest pain, states she feels intermittently SOB.

## 2023-10-15 NOTE — ED PROVIDER NOTE - NSICDXPASTMEDICALHX_GEN_ALL_CORE_FT
PAST MEDICAL HISTORY:  Calculus of gallbladder with chronic cholecystitis without obstruction     H/O dizziness Notes due to H/O Hypotension    H/O hypotension     Migraines     Vasovagal syndrome

## 2023-10-15 NOTE — ED PROVIDER NOTE - PROGRESS NOTE DETAILS
Labs reviewed, within normal limits. dimer, negative, trop negative, ekg sinus robert at 59bpm, otherwise normal. Pt notes her baseline BP is 90s/60s. Reevaluated, feels much better after fluids. Symptoms most likely vasovagal. Advised to follow up with neurologist regarding propranolol as it may be contributing to symptoms. Stable for discharge with PCP follow up. Strict return precautions were given. All questions and concerns were addressed. - Sharmila Jarvis PA-C 39 y/o female BIBEMS to ED s/o dizziness, some sob, and feeling like she  may pass out. Pt has h/o vasovagal syncope, notes she was at work and it felt warm, she started to feel like she may pass out, went to the bathroom and noted she was pale. Went outside and manager called ambulance. Pt denies LOC. Notes she increased her propranolol dose almost 1 month ago for her migraines. On OCP, denies fever, chills, vision changes, cp, palpitations, n/v, diarrhea, abd pain, dysuria, ext numbness/weakness/tingling, recent travel. On exam pt well appearing in NAD, PE unremarkable.Will follow labs, imaging, ekg, reassess. - Sharmila Jarvis PA-C 37 y/o female BIBEMS to ED s/o dizziness, some sob, and feeling like she  may pass out. Pt has h/o vasovagal syncope, notes she was at work and it felt warm, she started to feel like she may pass out, went to the bathroom and noted she was pale. Went outside and manager called ambulance. Pt denies LOC. Notes she increased her propranolol dose from 60mg to 80mg a few weeks ago for her migraines. On OCP, denies fever, chills, vision changes, cp, palpitations, n/v, diarrhea, abd pain, dysuria, ext numbness/weakness/tingling, recent travel. On exam pt well appearing in NAD, PE unremarkable.Will follow labs, imaging, ekg, reassess. - Sharmila Jarvis PA-C Labs reviewed, within normal limits. dimer, negative, trop negative, ekg sinus robert at 59bpm, otherwise normal. Pt notes her baseline BP is 90s/60s. Reevaluated, feels much better after fluids. Symptoms most likely vasovagal. Pt states she has several 60mg tablets of propranolol left; advised to call her neurologist tomorrow regarding propranolol as it may be contributing to symptoms and she may need to decreased dose. Pt understands and agrees to this plan. Stable for discharge with PCP follow up. Strict return precautions were given. All questions and concerns were addressed. - Sharmila Jarvis PA-C

## 2023-10-15 NOTE — ED PROVIDER NOTE - PATIENT PORTAL LINK FT
You can access the FollowMyHealth Patient Portal offered by [facility] by registering at the following website: [email or web address]. By joining [facility]’s FollowMyHealth portal, you will also be able to view your health information using other applications (apps) compatible with our system.

## 2023-10-15 NOTE — ED ADULT NURSE REASSESSMENT NOTE - NS ED NURSE REASSESS COMMENT FT1
Pt report received from previous RN Osana. Pt contact made. Pt has no new complaints at this time. Pt is A&Ox4 and speaking in full sentences. Pt is resting comfortably in bed, VSS, BP rising, safety precautions maintained. Awaiting  d/c  at this time.

## 2023-10-15 NOTE — ED ADULT TRIAGE NOTE - CHIEF COMPLAINT QUOTE
Pt. A&OX3, BIBEMS with c/o shortness of breath. EMS reports pt had sudden onset of shortness of breath this morning. Coming from work with increased shortness of breath. Increased on her Propanolol recently. Pt reports feeling warm in her office, went outside and then to the bathroom when she noticed she was pale. Went back outside when her manager called the ambulance.   Denies CP, nausea, vomiting.   EKG completed in triage.

## 2023-10-15 NOTE — ED PROVIDER NOTE - NSFOLLOWUPINSTRUCTIONS_ED_ALL_ED_FT
Follow up with PCP and neurologist. Return to ED for new or worsening symptoms.    Near-Syncope  Outline of the head showing blood vessels that supply the brain.  Near-syncope is when you suddenly feel like you might pass out or faint, but you do not actually lose consciousness. This may also be referred to as presyncope. During an episode of near-syncope, you may:  Feel dizzy, weak, light-headed, or like the room is spinning.  Feel nauseous.  See spots or see all white or all black in your field of vision.  Have cold, clammy skin or feel warm and sweaty.  Hear ringing in your ears (tinnitus).  This condition is caused by a sudden decrease in blood flow to the brain. This decrease can result from various causes, but most of those causes are not dangerous. However, near-syncope may be a sign of a serious medical problem, so it is important to seek medical care.    Follow these instructions at home:  Medicines    Take over-the-counter and prescription medicines only as told by your health care provider.  If you are taking blood pressure or heart medicine, get up slowly and take several minutes to sit and then stand. This can reduce dizziness and decrease the risk of near-syncope.  Lifestyle    Do not drive, use machinery, or play sports until your health care provider says it is okay.  Do not drink alcohol.  Do not use any products that contain nicotine or tobacco. These products include cigarettes, chewing tobacco, and vaping devices, such as e-cigarettes. If you need help quitting, ask your health care provider.  Avoid hot tubs and saunas.  General instructions    Pay attention to any changes in your symptoms.  Talk with your health care provider about your symptoms. You may need to have testing to understand the cause of your near-syncope.  If you start to feel like you might faint, sit or lie down right away. If sitting, put your head down between your legs. If lying down, raise (elevate) your feet above the level of your heart.  Breathe deeply and steadily. Wait until all of the symptoms have passed.  Have someone stay with you until you feel stable.  Drink enough fluid to keep your urine pale yellow.  Avoid prolonged standing. If you must stand for a long time, do movements such as:  Moving your legs.  Crossing your legs.  Flexing and stretching your leg muscles.  Squatting.  Keep all follow-up visits. This is important.  Contact a health care provider if:  You continue to have episodes of near fainting.  Get help right away if:  You faint.  You have any of these symptoms that may indicate trouble with your heart:  Fast or irregular heartbeats (palpitations).  Unusual pain in your chest, abdomen, or back.  Shortness of breath.  You have a seizure.  You have a severe headache.  You are confused.  You have vision problems.  You have severe weakness or trouble walking.  You are bleeding from your mouth or rectum, or have black or tarry stool.  These symptoms may represent a serious problem that is an emergency. Do not wait to see if your symptoms will go away. Get medical help right away. Call your local emergency services (911 in the [place] Do not drive yourself to the hospital.    Summary  Near-syncope is when you suddenly feel like you might pass out or faint, but you do not actually lose consciousness.  This condition is caused by a sudden decrease in blood flow to the brain. This decrease can result from various causes, but most of those causes are not dangerous.  Near-syncope may be a sign of a serious medical problem, so it is important to seek medical care.  If you start to feel like you might faint, sit or lie down right away. If sitting, put your head down between your legs. If lying down, raise (elevate) your feet above the level of your heart.  Talk with your health care provider about your symptoms. You may need to have testing to understand the cause of your near-syncope.  This information is not intended to replace advice given to you by your health care provider. Make sure you discuss any questions you have with your health care provider.

## 2023-10-16 ENCOUNTER — NON-APPOINTMENT (OUTPATIENT)
Age: 38
End: 2023-10-16

## 2023-10-16 PROBLEM — K80.10 CALCULUS OF GALLBLADDER WITH CHRONIC CHOLECYSTITIS WITHOUT OBSTRUCTION: Chronic | Status: ACTIVE | Noted: 2019-11-06

## 2023-10-16 PROBLEM — Z86.79 PERSONAL HISTORY OF OTHER DISEASES OF THE CIRCULATORY SYSTEM: Chronic | Status: ACTIVE | Noted: 2019-11-06

## 2023-10-16 PROBLEM — Z87.898 PERSONAL HISTORY OF OTHER SPECIFIED CONDITIONS: Chronic | Status: ACTIVE | Noted: 2019-11-06

## 2023-10-16 PROBLEM — G43.909 MIGRAINE, UNSPECIFIED, NOT INTRACTABLE, WITHOUT STATUS MIGRAINOSUS: Chronic | Status: ACTIVE | Noted: 2019-11-06

## 2024-08-12 ENCOUNTER — NON-APPOINTMENT (OUTPATIENT)
Age: 39
End: 2024-08-12

## 2024-08-12 NOTE — H&P PST ADULT - PAIN CHRONIC, PROFILE
Where do we go from here?      Are you symptomatic, very symptomatic?    Message sent to patient.   no

## 2024-12-19 ENCOUNTER — NON-APPOINTMENT (OUTPATIENT)
Age: 39
End: 2024-12-19